# Patient Record
Sex: MALE | Race: WHITE | NOT HISPANIC OR LATINO | Employment: OTHER | URBAN - METROPOLITAN AREA
[De-identification: names, ages, dates, MRNs, and addresses within clinical notes are randomized per-mention and may not be internally consistent; named-entity substitution may affect disease eponyms.]

---

## 2019-05-16 PROBLEM — M54.50 CHRONIC BILATERAL LOW BACK PAIN WITHOUT SCIATICA: Status: ACTIVE | Noted: 2019-05-16

## 2019-05-16 PROBLEM — J42 CHRONIC BRONCHITIS: Status: ACTIVE | Noted: 2019-05-16

## 2019-05-16 PROBLEM — G89.29 CHRONIC BILATERAL LOW BACK PAIN WITHOUT SCIATICA: Status: ACTIVE | Noted: 2019-05-16

## 2019-05-16 PROBLEM — B18.2 CHRONIC HEPATITIS C WITHOUT HEPATIC COMA: Status: ACTIVE | Noted: 2019-05-16

## 2019-05-16 PROBLEM — N52.9 ERECTILE DYSFUNCTION: Status: ACTIVE | Noted: 2019-05-16

## 2019-05-16 PROBLEM — I10 ESSENTIAL (PRIMARY) HYPERTENSION: Status: ACTIVE | Noted: 2019-05-16

## 2019-07-25 ENCOUNTER — TELEPHONE (OUTPATIENT)
Dept: PHARMACY | Facility: CLINIC | Age: 64
End: 2019-07-25

## 2019-08-07 ENCOUNTER — TELEPHONE (OUTPATIENT)
Dept: PHARMACY | Facility: CLINIC | Age: 64
End: 2019-08-07

## 2019-08-07 NOTE — TELEPHONE ENCOUNTER
Patient called for initial consult and ship on Epclusa - na lvm.     RUEL Fernández.Ph., AAHIVP  Clinical Pharmacist, HIV/HCV  [facility]  Phone: [phone]

## 2019-08-20 NOTE — TELEPHONE ENCOUNTER
Initial Medication Consultation: MARYLIN Epclusa    Initial Epclusa consult completed on . Epclusa will be shipped on  to arrive at patient's home on  via [employer]. $0.00 copay. Patient will start Epclusa on . Address confirmed. Confirmed 2 patient identifiers - name and . Therapy Appropriate.     Epclusa 400/100mg- Take one tablet by mouth daily x 12 weeks  Counseling was reviewed:   1. Patient MUST take Epclusa at the SAME time every day.   2. Patient MUST avoid acid reducers without consulting with myself or provider first. Antacids are to be spaced out at least 4 hours apart from Epclusa.  Zantac 150 mg is to be taken AT THE SAME TIME as Epclusa.    3. Potential Side effects include: headaches and fatigue.   Headache: Patient may treat with OTC remedies. If Tylenol is used, dose should not exceed 2000mg per day.    4. Medication list reviewed. No DDIs or allergies noted. Patient MUST contact myself or provider prior to starting any new OTC, herbal, or prescription drugs to avoid potential DDIs.    DDI: Medication list reviewed and potential DDIs addressed.  - Zantac 150 mg is to be taken AT THE SAME TIME as Epclusa; holding PPI  - Lipitor to be HELD while on HCV treatment    Discussed the importance of staying well hydrated while on therapy. Compliance stressed - patient to take missed doses as soon as remembered, but NOT to take 2 doses in one day. Patient will report questions or concerns to myself or practitioner. Patient verbalizes understanding. Patient plans to start Epclusa on .  Consultation included: indication; goals of treatment; administration; storage and handling; side effects; how to handle side effects; the importance of compliance; how to handle missed doses; the importance of laboratory monitoring; the importance of keeping all follow up appointments.  Patient understands to report any medication changes to [facility] and provider. All questions answered and addressed to patients  satisfaction. I will f/u with her in 1 week from start, [facility] to contact patient in 3 weeks for refills.     AG Epclusa dispensed due to Medicaid formulary. Any reference to Epclusa in above note is for AG Epclusa.     Jaylene Jacobs, PharmD, [facility]  Clinical Pharmacist   [facility]  Phone: [phone]

## 2019-09-04 ENCOUNTER — TELEPHONE (OUTPATIENT)
Dept: PHARMACY | Facility: CLINIC | Age: 64
End: 2019-09-04

## 2019-09-04 NOTE — TELEPHONE ENCOUNTER
"Epclusa 7 Day Touchbase - Name/ confirmed.  Confirmed patient started medication as instructed on .  Patient confirms that they are taking Epclusa every day around 10:00 am.  Patient denies skipping or missing doses.  Patient reports experiencing headache and tiredness "pretty much every day". He takes Tylenol and reports it helps. Patient reports no new health conditions or allergies. Patient states he is taking Zantac 300 mg twice daily. He takes the first dose with his Epclusa and the second dose around dinner. Advised patient that Zantac should be taken once daily AT THE SAME TIME as Epclusa. Patient states he must take Zantac twice daily in order to control his acid reflux symptoms and that taking it once daily will not hold him throughout the day. Counseled patient regarding potential drug interaction and he stated he will discuss with his provider. Advised to call [facility] and provider if any issues arise.  No questions or concerns at this time. Patient aware [facility] will reach out ~ 7 days before refill is due.    "

## 2019-09-16 ENCOUNTER — TELEPHONE (OUTPATIENT)
Dept: PHARMACY | Facility: CLINIC | Age: 64
End: 2019-09-16

## 2019-09-16 NOTE — TELEPHONE ENCOUNTER
Epclusa refill (2 of 3) confirmed and follow-up completed. We will ship Epclusa refill on  via [employer] to arrive on . $0 copay- 004. Confirmed 2 patient identifiers - name and .     Patient has 6 doses of Epclusa remaining and takes it at the same time daily.  Pt reports they are not having any side effects so far. No missed doses, no new medications, no new allergies or health conditions reported at this time. All questions answered and addressed to patients satisfaction. Pt verbalized understanding.

## 2019-10-14 ENCOUNTER — TELEPHONE (OUTPATIENT)
Dept: PHARMACY | Facility: CLINIC | Age: 64
End: 2019-10-14

## 2019-10-14 NOTE — TELEPHONE ENCOUNTER
Attempted to reach patient for Epclusa refill 3 of 3. No answer. Left voicemail for call back. Will follow-up.

## 2020-01-06 PROBLEM — F43.10 PTSD (POST-TRAUMATIC STRESS DISORDER): Status: ACTIVE | Noted: 2020-01-06

## 2020-01-06 PROBLEM — F31.81 BIPOLAR 2 DISORDER: Status: ACTIVE | Noted: 2020-01-06

## 2020-01-21 PROBLEM — Z53.21 PATIENT LEFT BEFORE EVALUATION BY PHYSICIAN: Status: ACTIVE | Noted: 2020-01-21

## 2020-03-09 PROBLEM — G47.9 SLEEP DISTURBANCE: Status: ACTIVE | Noted: 2020-03-09

## 2020-07-29 PROBLEM — F31.76 BIPOLAR DISORDER, IN FULL REMISSION, MOST RECENT EPISODE DEPRESSED: Status: ACTIVE | Noted: 2017-09-06

## 2020-07-29 PROBLEM — M54.6 CHRONIC BILATERAL THORACIC BACK PAIN: Status: ACTIVE | Noted: 2017-09-06

## 2020-07-29 PROBLEM — F33.42 RECURRENT MAJOR DEPRESSIVE DISORDER, IN FULL REMISSION: Status: ACTIVE | Noted: 2017-09-06

## 2020-07-29 PROBLEM — G89.29 CHRONIC BILATERAL THORACIC BACK PAIN: Status: ACTIVE | Noted: 2017-09-06

## 2020-08-02 PROBLEM — E78.00 PURE HYPERCHOLESTEROLEMIA: Status: ACTIVE | Noted: 2020-08-02

## 2020-08-04 PROBLEM — F11.21 OPIOID USE DISORDER, SEVERE, IN SUSTAINED REMISSION: Status: ACTIVE | Noted: 2020-08-04

## 2020-08-04 PROBLEM — F17.200 TOBACCO USE DISORDER: Status: ACTIVE | Noted: 2020-08-04

## 2020-08-04 PROBLEM — F10.11 ALCOHOL USE DISORDER, MILD, IN EARLY REMISSION: Status: ACTIVE | Noted: 2020-08-04

## 2020-08-04 PROBLEM — E66.9 OBESITY (BMI 30-39.9): Status: ACTIVE | Noted: 2020-08-04

## 2020-11-09 PROBLEM — L82.1 SEBORRHEIC KERATOSES: Status: ACTIVE | Noted: 2020-11-09

## 2020-11-09 PROBLEM — F31.76 BIPOLAR DISORDER, IN FULL REMISSION, MOST RECENT EPISODE DEPRESSED: Status: RESOLVED | Noted: 2017-09-06 | Resolved: 2020-11-09

## 2020-11-09 PROBLEM — S62.92XA CLOSED FRACTURE OF LEFT HAND: Status: ACTIVE | Noted: 2020-11-09

## 2020-11-09 PROBLEM — G47.33 OSA ON CPAP: Status: ACTIVE | Noted: 2020-03-09

## 2021-02-02 PROBLEM — F10.10 ALCOHOL USE DISORDER, MILD, ABUSE: Status: ACTIVE | Noted: 2020-08-04

## 2021-05-12 ENCOUNTER — PATIENT MESSAGE (OUTPATIENT)
Dept: RESEARCH | Facility: HOSPITAL | Age: 66
End: 2021-05-12

## 2021-07-13 PROBLEM — L85.8 KERATOACANTHOMA: Status: ACTIVE | Noted: 2021-07-13

## 2021-08-05 PROBLEM — F31.70 BIPOLAR DISORDER IN FULL REMISSION: Status: ACTIVE | Noted: 2020-01-06

## 2022-02-24 PROBLEM — F43.22 ADJUSTMENT DISORDER WITH ANXIOUS MOOD: Status: ACTIVE | Noted: 2022-02-24

## 2022-07-14 PROBLEM — F12.10 CANNABIS ABUSE: Status: ACTIVE | Noted: 2022-07-14

## 2023-01-17 PROBLEM — F43.22 ADJUSTMENT DISORDER WITH ANXIOUS MOOD: Status: RESOLVED | Noted: 2022-02-24 | Resolved: 2023-01-17

## 2023-06-01 PROBLEM — N40.1 BENIGN PROSTATIC HYPERPLASIA WITH INCOMPLETE BLADDER EMPTYING: Status: ACTIVE | Noted: 2023-06-01

## 2023-06-01 PROBLEM — R39.14 BENIGN PROSTATIC HYPERPLASIA WITH INCOMPLETE BLADDER EMPTYING: Status: ACTIVE | Noted: 2023-06-01

## 2023-06-01 PROBLEM — L72.3 SCROTAL SEBACEOUS CYST: Status: ACTIVE | Noted: 2023-06-01

## 2023-06-01 PROBLEM — Z12.5 PROSTATE CANCER SCREENING: Status: ACTIVE | Noted: 2023-06-01

## 2023-08-15 PROBLEM — K44.9 HIATAL HERNIA: Status: ACTIVE | Noted: 2023-08-15

## 2023-08-15 PROBLEM — K21.9 GASTROESOPHAGEAL REFLUX DISEASE: Status: ACTIVE | Noted: 2023-08-15

## 2023-12-30 PROBLEM — R19.7 DIARRHEA: Status: ACTIVE | Noted: 2023-12-30

## 2023-12-30 PROBLEM — K63.5 POLYP OF COLON: Status: ACTIVE | Noted: 2023-12-30

## 2023-12-30 PROBLEM — Z12.11 SCREENING FOR COLON CANCER: Status: ACTIVE | Noted: 2023-12-30

## 2024-05-15 PROBLEM — B18.2 CHRONIC HEPATITIS C WITHOUT HEPATIC COMA: Status: RESOLVED | Noted: 2019-05-16 | Resolved: 2024-05-15

## 2024-05-15 PROBLEM — Z86.19 HEPATITIS C VIRUS INFECTION CURED AFTER ANTIVIRAL DRUG THERAPY: Status: ACTIVE | Noted: 2024-05-15

## 2024-08-22 PROBLEM — Z87.891 PERSONAL HISTORY OF TOBACCO USE, PRESENTING HAZARDS TO HEALTH: Status: ACTIVE | Noted: 2024-08-22

## 2024-10-25 PROBLEM — Z90.81 HISTORY OF SPLENECTOMY: Status: ACTIVE | Noted: 2024-10-25

## 2024-10-25 PROBLEM — Z98.890 HISTORY OF EXPLORATORY LAPAROTOMY: Status: ACTIVE | Noted: 2024-10-25

## 2024-10-25 PROBLEM — B19.20 HEPATITIS C VIRUS INFECTION WITHOUT HEPATIC COMA: Status: ACTIVE | Noted: 2024-10-25

## 2024-10-25 PROBLEM — E78.5 HYPERLIPIDEMIA: Status: ACTIVE | Noted: 2024-10-25

## 2024-10-25 PROBLEM — I10 HYPERTENSION: Status: ACTIVE | Noted: 2024-10-25

## 2024-12-10 ENCOUNTER — PATIENT MESSAGE (OUTPATIENT)
Dept: ADMINISTRATIVE | Facility: HOSPITAL | Age: 69
End: 2024-12-10

## 2025-03-07 PROBLEM — R10.84 GENERALIZED ABDOMINAL PAIN: Status: ACTIVE | Noted: 2025-03-07

## 2025-03-07 PROBLEM — R11.2 NAUSEA & VOMITING: Status: ACTIVE | Noted: 2025-03-07

## 2025-05-02 PROBLEM — L82.0 SEBORRHEIC KERATOSES, INFLAMED: Status: ACTIVE | Noted: 2020-11-09

## 2025-05-02 PROBLEM — L57.0 ACTINIC KERATOSES: Status: ACTIVE | Noted: 2025-05-02

## 2025-05-02 PROBLEM — D49.2 SKIN NEOPLASM: Status: ACTIVE | Noted: 2025-05-02
